# Patient Record
Sex: MALE | Race: WHITE | HISPANIC OR LATINO | Employment: FULL TIME | URBAN - METROPOLITAN AREA
[De-identification: names, ages, dates, MRNs, and addresses within clinical notes are randomized per-mention and may not be internally consistent; named-entity substitution may affect disease eponyms.]

---

## 2020-11-04 ENCOUNTER — HOSPITAL ENCOUNTER (EMERGENCY)
Facility: MEDICAL CENTER | Age: 40
End: 2020-11-04
Attending: EMERGENCY MEDICINE

## 2020-11-04 VITALS
DIASTOLIC BLOOD PRESSURE: 81 MMHG | RESPIRATION RATE: 18 BRPM | HEIGHT: 71 IN | BODY MASS INDEX: 30.56 KG/M2 | WEIGHT: 218.26 LBS | TEMPERATURE: 96.7 F | OXYGEN SATURATION: 97 % | SYSTOLIC BLOOD PRESSURE: 138 MMHG | HEART RATE: 81 BPM

## 2020-11-04 DIAGNOSIS — H60.501 ACUTE OTITIS EXTERNA OF RIGHT EAR, UNSPECIFIED TYPE: ICD-10-CM

## 2020-11-04 PROCEDURE — 99281 EMR DPT VST MAYX REQ PHY/QHP: CPT

## 2020-11-04 RX ORDER — NEOMYCIN SULFATE, POLYMYXIN B SULFATE AND HYDROCORTISONE 10; 3.5; 1 MG/ML; MG/ML; [USP'U]/ML
5 SUSPENSION/ DROPS AURICULAR (OTIC) 3 TIMES DAILY
Qty: 10 ML | Refills: 0 | Status: SHIPPED | OUTPATIENT
Start: 2020-11-04

## 2020-11-05 NOTE — ED NOTES
Pt discharged home. Pt verbalized understand of discharge and medication instructions, all questions addressed. Pt advised to follow-up with PCP or return to ED for any new or worsening of symptoms. Pt is ambulating well and steady on feet. VSS.

## 2020-11-05 NOTE — ED TRIAGE NOTES
"Chief Complaint   Patient presents with   • Ear Pain     pt complaining of R ear pain for 1 week, denies any drainage, or muffled hearing.          Pt walk in tonight for above complaint. Pt AOx4, no signs of distress. Pt states that this ear pain happens every \"two years\" and that \"he knows it is an ear infection\". Pt denies taking any ABX and denies any fevers. Educated pt on triage process and to notify if there is any change      /85   Pulse 79   Temp 35.9 °C (96.7 °F) (Temporal)   Resp 16   Ht 1.803 m (5' 11\")   Wt 99 kg (218 lb 4.1 oz)   SpO2 96%   BMI 30.44 kg/m²     "

## 2020-11-05 NOTE — ED PROVIDER NOTES
"ED Provider Note    Scribed for Ramon Petty M.D. by Scott Morales. 11/4/2020  8:52 PM    Primary care provider: None noted  Means of arrival: Walk-in  History obtained from: Patient  History limited by: None    CHIEF COMPLAINT  Chief Complaint   Patient presents with   • Ear Pain     pt complaining of R ear pain for 1 week, denies any drainage, or muffled hearing.        HPI  Pedrito Crandall is a 40 y.o. male who presents to the Emergency Department for right ear pain onset approximately 4 days ago. Patient denies any fevers, drainage, or muffled hearing. Patient has no other medical complaints at this time. Patient has no known drug allergies. Patient notes no exacerbating or alleviating factors.   PPE Note: I personally donned full PPE for all patient encounters during this visit, including being clean-shaven with an N95 respirator mask and gloves.    Scribe remained outside the patient's room and did not have any contact with the patient for the duration of patient encounter.     REVIEW OF SYSTEMS  Pertinent negatives include no fevers, drainage, or muffled hearing.    See HPI for further details.     PAST MEDICAL HISTORY  None noted    SURGICAL HISTORY  patient denies any surgical history    SOCIAL HISTORY  Social History     Tobacco Use   • Smoking status: None noted   Substance Use Topics   • Alcohol use: None noted    • Drug use: None noted      Social History     Substance and Sexual Activity   Drug Use None noted       FAMILY HISTORY  None noted    CURRENT MEDICATIONS  Home Medications     Reviewed by Ana Kimball R.N. (Registered Nurse) on 11/04/20 at 1942  Med List Status: Complete   Medication Last Dose Status        Patient Gianni Taking any Medications                     ALLERGIES  No Known Allergies    PHYSICAL EXAM  VITAL SIGNS: /85   Pulse 79   Temp 35.9 °C (96.7 °F) (Temporal)   Resp 16   Ht 1.803 m (5' 11\")   Wt 99 kg (218 lb 4.1 oz)   SpO2 96%   BMI 30.44 kg/m² "   Constitutional: Well developed, Well nourished, No acute distress, Non-toxic appearance.   HENT: Left tympanic membrane and external canal normal.  Right tympanic membrane is very difficult to visualize but does not appear to be infected although the external canal is quite infected and narrowed in size.  With traction on the helix he has significant increase in pain    COURSE & MEDICAL DECISION MAKING  Nursing notes, VS, PMSFHx reviewed in chart.    8:52 PM Patient seen and examined at bedside.  Clinically he has otitis externa on the right side.  Patient was informed that he is to be discharged with Pediotic HC. Patient verbalized his understanding and agreement with the plan of discharge.     Patient has had high blood pressure while in the emergency department, felt likely secondary to medical condition. Counseled patient to monitor blood pressure at home and follow up with primary care physician.      The patient will return for new or worsening symptoms and is stable at the time of discharge.  I have prescribed him Cortisporin otic    DISPOSITION:  Patient will be discharged home in stable condition.    FINAL IMPRESSION  1. Acute otitis externa of right ear, unspecified type        I, Scott Morales (Nicci), am scribing for, and in the presence of, Ramon Petty M.D..    Electronically signed by: Scott Morales (Nicci), 11/4/2020    IRamon M.D. personally performed the services described in this documentation, as scribed by Scott Morales in my presence, and it is both accurate and complete. E    The note accurately reflects work and decisions made by me.  Ramon Petty M.D.  11/4/2020  9:20 PM